# Patient Record
(demographics unavailable — no encounter records)

---

## 2025-03-19 NOTE — PHYSICAL EXAM
[Alert] : alert [Normocephalic] : normocephalic [Flat Open Anterior [place]] : flat open anterior fontanelle [PERRL] : PERRL [Red Reflex Bilateral] : red reflex bilateral [Normally Placed Ears] : normally placed ears [Auricles Well Formed] : auricles well formed [Clear Tympanic membranes] : clear tympanic membranes [Light reflex present] : light reflex present [Bony structures visible] : bony structures visible [Patent Auditory Canal] : patent auditory canal [Nares Patent] : nares patent [Palate Intact] : palate intact [Uvula Midline] : uvula midline [Supple, full passive range of motion] : supple, full passive range of motion [Symmetric Chest Rise] : symmetric chest rise [Clear to Auscultation Bilaterally] : clear to auscultation bilaterally [Regular Rate and Rhythm] : regular rate and rhythm [S1, S2 present] : S1, S2 present [+2 Femoral Pulses] : +2 femoral pulses [Soft] : soft [Bowel Sounds] : bowel sounds present [Umbilical Stump Dry, Clean, Intact] : umbilical stump dry, clean, intact [Normal external genitailia] : normal external genitalia [Central Urethral Opening] : central urethral opening [Testicles Descended Bilaterally] : testicles descended bilaterally [Patent] : patent [Normally Placed] : normally placed [No Abnormal Lymph Nodes Palpated] : no abnormal lymph nodes palpated [Symmetric Flexed Extremities] : symmetric flexed extremities [Startle Reflex] : startle reflex present [Suck Reflex] : suck reflex present [Rooting] : rooting reflex present [Palmar Grasp] : palmar grasp present [Plantar Grasp] : plantar reflex present [Symmetric Jayla] : symmetric [place] [Acute Distress] : no acute distress [Icteric sclera] : nonicteric sclera [Discharge] : no discharge [Palpable Masses] : no palpable masses [Murmurs] : no murmurs [Tender] : nontender [Distended] : not distended [Hepatomegaly] : no hepatomegaly [Splenomegaly] : no splenomegaly [Wilde-Ortolani] : negative Wilde-Ortolani [Spinal Dimple] : no spinal dimple [Tuft of Hair] : no tuft of hair [Jaundice] : not jaundice [de-identified] : extra digit on left foot

## 2025-03-19 NOTE — DISCUSSION/SUMMARY
[Normal Growth] : growth [Normal Development] : developmental [No Elimination Concerns] : elimination [Continue Regimen] : feeding [No Skin Concerns] : skin [Normal Sleep Pattern] : sleep [None] : no known medical problems [Anticipatory Guidance Given] : Anticipatory guidance addressed as per the history of present illness section [ Transition] :  transition [ Care] :  care [Nutritional Adequacy] : nutritional adequacy [Parental Well-Being] : parental well-being [Safety] : safety [No Vaccines] : no vaccines needed [No Medications] : ~He/She~ is not on any medications [Parent/Guardian] : Parent/Guardian [FreeTextEntry1] :  8 day old  growing and developing well with polydactyly of left foot.  will refer to orthopedist as extra digit has bone within in.  TSH and fT4 at lab due to abnormal  screen.  Routine  feeding and care reviewed.  Some sneezing, coughing, hiccups, mild nasal congestion can be normal.  anticipatory guidance provided.   A rectal temperature of 100.4 or higher would be extremely concerning and patient will need to go immediately to the ER for further assessment of care.   follow up for 1 month well care visit, sooner as needed

## 2025-03-19 NOTE — HISTORY OF PRESENT ILLNESS
[Born at ___ Wks Gestation] : The patient was born at [unfilled] weeks gestation [C/S] : via  section [BW: _____] : weight of [unfilled] [DW: _____] : Discharge weight was [unfilled] [Normal] : Normal [In Bassinet/Crib] : sleeps in bassinet/crib [On back] : sleeps on back [FreeTextEntry8] :  screen #: [id number] hearing screen passed 3/13/25 polydactyly of  left foot [Loose bedding, pillow, toys, and/or bumpers in crib] : no loose bedding, pillow, toys, and/or bumpers in crib [de-identified] : formula and some breast milk 2-3 ounces every 2-3 hours.  [FreeTextEntry1] : delivered by repeat  for preeclampsia.  mother is taking aspirin 81mg. Mother has 18mo son who is healthy.

## 2025-04-07 NOTE — DISCUSSION/SUMMARY
[Normal Growth] : growth [Normal Development] : development  [No Elimination Concerns] : elimination [Continue Regimen] : feeding [No Skin Concerns] : skin [Normal Sleep Pattern] : sleep [Term Infant] : term infant [None] : no medical problems [Anticipatory Guidance Given] : Anticipatory guidance addressed as per the history of present illness section [Parental Well-Being] : parental well-being [Family Adjustment] : family adjustment [Feeding Routines] : feeding routines [Infant Adjustment] : infant adjustment [Safety] : safety [Age Approp Vaccines] : Age appropriate vaccines administered [No Medications] : ~He/She~ is not on any medications [Mother] : mother [FreeTextEntry1] :  1 month old growing and developing well reviewed recent TSH, fT4 was normal  Continue feeding ad adeel. When in car, patient should be in rear-facing car seat in back seat. Put baby to sleep on back, in own crib with no loose or soft bedding. Help baby to develop sleep and feeding routines. May offer pacifier if needed. Start tummy time when awake. Limit baby's exposure to others, especially those with fever or unknown vaccine status. Parents counseled to call or go to ER if rectal temperature >100.4 degrees F.  advised to call orthopedics for polydactyly   follow up in 1 month for well , sooner as needed.

## 2025-04-07 NOTE — HISTORY OF PRESENT ILLNESS
[Mother] : mother [In Bassinet/Crib] : sleeps in bassinet/crib [On back] : sleeps on back [Normal] : Normal [Loose bedding, pillow, toys, and/or bumpers in crib] : no loose bedding, pillow, toys, and/or bumpers in crib [No] : No cigarette smoke exposure [Exposure to electronic nicotine delivery system] : No exposure to electronic nicotine delivery system [Rear facing car seat in back seat] : Rear facing car seat in back seat [Carbon Monoxide Detectors] : Carbon monoxide detectors at home [Smoke Detectors] : Smoke detectors at home. [de-identified] : similac formula 4oz every 2 hours, one 5 hours stretch overnight [YES] : Yes

## 2025-04-07 NOTE — DEVELOPMENTAL MILESTONES
[Passed] : passed [FreeTextEntry2] : 1 [Normal Development] : Normal Development [Calms when picked up or spoken to] : calms when picked up or spoken to [Looks briefly at objects] : looks briefly at objects [Alerts to unexpected sound] : alerts to unexpected sound [Makes brief short vowel sounds] : makes brief short vowel sounds [Holds chin up in prone] : holds chin up in prone [Holds fingers more open at rest] : holds fingers more open at rest [FreeTextEntry1] :  Social Determinants of Health domains were screened and scored.

## 2025-04-07 NOTE — PHYSICAL EXAM
[Alert] : alert [Acute Distress] : no acute distress [Normocephalic] : normocephalic [Flat Open Anterior [place]] : flat open anterior fontanelle [PERRL] : PERRL [Red Reflex Bilateral] : red reflex bilateral [Normally Placed Ears] : normally placed ears [Auricles Well Formed] : auricles well formed [Clear Tympanic membranes] : clear tympanic membranes [Light reflex present] : light reflex present [Bony landmarks visible] : bony landmarks visible [Discharge] : no discharge [Nares Patent] : nares patent [Palate Intact] : palate intact [Uvula Midline] : uvula midline [Supple, full passive range of motion] : supple, full passive range of motion [Palpable Masses] : no palpable masses [Symmetric Chest Rise] : symmetric chest rise [Clear to Auscultation Bilaterally] : clear to auscultation bilaterally [Regular Rate and Rhythm] : regular rate and rhythm [S1, S2 present] : S1, S2 present [Murmurs] : no murmurs [+2 Femoral Pulses] : +2 femoral pulses [Soft] : soft [Tender] : nontender [Distended] : not distended [Bowel Sounds] : bowel sounds present [Hepatomegaly] : no hepatomegaly [Splenomegaly] : no splenomegaly [Normal external genitailia] : normal external genitalia [Central Urethral Opening] : central urethral opening [Testicles Descended Bilaterally] : testicles descended bilaterally [Normally Placed] : normally placed [No Abnormal Lymph Nodes Palpated] : no abnormal lymph nodes palpated [Wilde-Ortolani] : negative Wilde-Ortolani [Symmetric Flexed Extremities] : symmetric flexed extremities [Spinal Dimple] : no spinal dimple [Tuft of Hair] : no tuft of hair [Startle Reflex] : startle reflex present [Suck Reflex] : suck reflex present [Rooting] : rooting reflex present [Palmar Grasp] : palmar grasp reflex present [Plantar Grasp] : plantar grasp reflex present [Symmetric Jayla] : symmetric [place] [Jaundice] : no jaundice [Rash and/or lesion present] : no rash/lesion [de-identified] : extra digit on left foot

## 2025-05-07 NOTE — DISCUSSION/SUMMARY
[Normal Growth] : growth [Normal Development] : development  [No Elimination Concerns] : elimination [Continue Regimen] : feeding [No Skin Concerns] : skin [Normal Sleep Pattern] : sleep [Anticipatory Guidance Given] : Anticipatory guidance addressed as per the history of present illness section [Age Approp Vaccines] : Age appropriate vaccines administered [No Medications] : ~He/She~ is not on any medications [Mother] : mother [] : The components of the vaccine(s) to be administered today are listed in the plan of care. The disease(s) for which the vaccine(s) are intended to prevent and the risks have been discussed with the caretaker.  The risks are also included in the appropriate vaccination information statements which have been provided to the patient's caregiver.  The caregiver has given consent to vaccinate. [FreeTextEntry1] : 2month old growing and developing well with recent admission to [facility] for UTI.   UTI:  advised urology followup and kidney/bladder ultrasound ordered. return precautions for fevers discussed. All parent's questions answered  Will treat diaper rash with muprirocin +hydrocortisone BID with a zinc oxide based diaper cream for all other changes.  Use water instead of wipes and allow to air dry. follow up if rash worsens.   well care: Continue feeding ad adeel.  When in car, patient should be in rear-facing car seat in back seat. Put baby to sleep on back, in own crib with no loose or soft bedding. Help baby to maintain sleep and feeding routines. May offer pacifier if needed. Continue tummy time when awake. Parents counseled to call if rectal temperature >100.4 degrees F.   vaccines: [id number], PCV20, Rota   number for orthopedics for polydactyly provided.   follow up in 2 months for well , sooner as needed

## 2025-05-07 NOTE — PHYSICAL EXAM
[Alert] : alert [Normocephalic] : normocephalic [Flat Open Anterior [place]] : flat open anterior fontanelle [PERRL] : PERRL [Red Reflex Bilateral] : red reflex bilateral [Normally Placed Ears] : normally placed ears [Auricles Well Formed] : auricles well formed [Clear Tympanic membranes] : clear tympanic membranes [Light reflex present] : light reflex present [Bony landmarks visible] : bony landmarks visible [Nares Patent] : nares patent [Palate Intact] : palate intact [Uvula Midline] : uvula midline [Supple, full passive range of motion] : supple, full passive range of motion [Symmetric Chest Rise] : symmetric chest rise [Clear to Auscultation Bilaterally] : clear to auscultation bilaterally [Regular Rate and Rhythm] : regular rate and rhythm [S1, S2 present] : S1, S2 present [+2 Femoral Pulses] : +2 femoral pulses [Soft] : soft [Bowel Sounds] : bowel sounds present [Normal external genitailia] : normal external genitalia [Central Urethral Opening] : central urethral opening [Testicles Descended Bilaterally] : testicles descended bilaterally [Normally Placed] : normally placed [No Abnormal Lymph Nodes Palpated] : no abnormal lymph nodes palpated [Symmetric Flexed Extremities] : symmetric flexed extremities [Startle Reflex] : startle reflex present [Suck Reflex] : suck reflex present [Rooting] : rooting reflex present [Palmar Grasp] : palmar grasp reflex present [Plantar Grasp] : plantar grasp reflex present [Symmetric Jayla] : symmetric [place] [Acute Distress] : no acute distress [Discharge] : no discharge [Palpable Masses] : no palpable masses [Murmurs] : no murmurs [Tender] : nontender [Distended] : not distended [Hepatomegaly] : no hepatomegaly [Splenomegaly] : no splenomegaly [Wilde-Ortolani] : negative Wilde-Ortolani [Spinal Dimple] : no spinal dimple [Tuft of Hair] : no tuft of hair [Rash and/or lesion present] : no rash/lesion [de-identified] : polydactyly of toes [de-identified] : diaper rash with open areas

## 2025-05-07 NOTE — DEVELOPMENTAL MILESTONES
[Normal Development] : Normal Development [Smiles responsively] : smiles responsively [Vocalizes with simple cooing] : vocalizes with simple cooing [Lifts head and chest in prone] : lifts head and chest in prone [Opens and shuts hands] : opens and shuts hands [FreeTextEntry1] :  Social Determinants of Health domains were screened and scored.  [Passed] : passed

## 2025-05-07 NOTE — HISTORY OF PRESENT ILLNESS
[Mother] : mother [Normal] : Normal [In Bassinet/Crib] : sleeps in bassinet/crib [On back] : sleeps on back [Loose bedding, pillow, toys, and/or bumpers in crib] : no loose bedding, pillow, toys, and/or bumpers in crib [No] : No cigarette smoke exposure [Exposure to electronic nicotine delivery system] : No exposure to electronic nicotine delivery system [Rear facing car seat in back seat] : Rear facing car seat in back seat [Carbon Monoxide Detectors] : Carbon monoxide detectors at home [Smoke Detectors] : Smoke detectors at home. [de-identified] : 4 oz formula every 2 hours. sleep through the night [FreeTextEntry1] : Mother reports that patient was admitted to [facility] with a UTI 6 days ago.  he was discharged 2 days ago  (admit date 5/3/25, discharge date 5/5/25). He was started on cephalexin 250/5ml take 3ml BID x7 days. they also advised polyvisol with iron for low iron levels- lab results from [facility] are not available, patient discharge summary reviewed. On discharge it was advised that a kidney and bladder ultrasound be scheduled for 4-5 weeks from    discharge.  Since discharge he is doing well but having frequent loose stool and has since developed a bad diaper rash.

## 2025-06-06 NOTE — CONSULT LETTER
[FreeTextEntry1] : Dear Dr. Taylor,   I had the pleasure of seeing Joe for follow up today. Below is my note regarding the office visit today.    Thank you so very much for allowing me to participate in Joe's care. Please don't hesitate to call me should any questions or issues arise .    Sincerely,    Ernie Euceda MD, FACS, [facility]    Chief, Pediatric Urology    Professor of Urology and Pediatrics    [employer]    President, American U[employer] Association - [place] Section    Past-President, Societies for Pediatric Urology

## 2025-06-06 NOTE — DATA REVIEWED
[FreeTextEntry1] : EXAMINATION: US RENAL AND PELVIS TODAY IN OFFICE FINDINGS: LEFT GRADE 1 HYDRONEPHROSIS OTHERWISE UNREMARKABLE KIDNEY AND PELVIC STRUCTURES  ACC: [id number]     EXAM:  US KIDNEYS AND BLADDER   ORDERED BY: RAMOS DIAZ PROCEDURE DATE:  05/12/2025 INTERPRETATION:  CLINICAL INFORMATION: UTI diagnosed at [facility] on 5/3/2025 COMPARISON: None available. TECHNIQUE: Sonography of the kidneys and bladder. FINDINGS: Right kidney: 4.7 cm. No renal mass, hydronephrosis or calculi.  Left kidney: 5.4 cm. Mild hydronephrosis.  Urinary bladder: Within normal limits.  IMPRESSION: Mild left hydronephrosis.  --- End of Report --- LJ WEINBERG MD; Resident Radiologist This document has been electronically signed. LINDA KERN MD; Attending Radiologist This document has been electronically signed. May 12 2025  4:04PM

## 2025-06-06 NOTE — CONSULT LETTER
[FreeTextEntry1] : Dear Dr. Taylor,   I had the pleasure of seeing Joe for follow up today. Below is my note regarding the office visit today.    Thank you so very much for allowing me to participate in Joe's care. Please don't hesitate to call me should any questions or issues arise .    Sincerely,    Ernie Euceda MD, FACS, [facility]    Chief, Pediatric Urology    Professor of Urology and Pediatrics    [employer]    President, American U[employer] Association - [place] Section    Past-President, Societies for Pediatric Urology

## 2025-06-06 NOTE — ASSESSMENT
[FreeTextEntry1] : Joe had a febrile UTI with hospitalization requiring IV antibiotics at outside hospital. Records not available for review. RBUS at time of hospital admission demonstrated mild left hydronephrosis. RBUS today demonstrated left grade 1 hydronephrosis. Physical exam today demonstrated phimosis and small bilateral hydroceles. We discussed causes, anatomical considerations and implications and came up with the following plan:  Febrile UTI and left sided hydronephrosis: -I explained the condition, its possible causes and implications. We discussed the evaluation and possible management strategies. Imaging in this case includes a sonogram, which was done and a VCUG. I described the VCUG test. I answered all questions. We will reconvene after the study. 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 I had a long discussion regarding the nature of hydroceles, their natural history and their management. At this point, observation is indicated as these will typically resolve with time. I suggested another visit at age 6-8 months for reexam. An earlier evaluation should take place if they note that the hydrocele has markedly increased in size or if there is suddenly changes in size during the course of the day. All questions were answered  Phimosis: -Mom discussed interest in a circumcision. Discussed that because of age this would have to be done in the operating room. We will rediscuss after VCUG and at follow up.

## 2025-06-06 NOTE — HISTORY OF PRESENT ILLNESS
[TextBox_4] : Joe is a 2 m.o. male being seen today after 5-day hospital stay for febrile UTI. Records not available for review. Patient presented 1 month ago to [facility] for malodorous urine and fever. Treated with IV antibiotics for 5 days and discharged on 8 days of PO antibiotics. RBUS 5/12/25 demonstrated mild left hydronephrosis.  Not currently on prophylaxis. Has not had any interval infections. Voiding and stooling appropriately, tolerating po intake.  Early development, Joe was born FT without complications and was discharged home with mom. Prenatal scans did not demonstrate hydronephrosis. No significant FH.

## 2025-06-06 NOTE — HISTORY OF PRESENT ILLNESS
[TextBox_4] : Joe is a 2 m.o. male being seen today after 5-day hospital stay for febrile UTI. Records not available for review. Patient presented 1 month ago to [facility] for malodorous urine and fever. Treated with IV antibiotics for 5 days and discharged on 8 days of PO antibiotics. RBUS 5/12/25 demonstrated mild left hydronephrosis.  Not currently on prophylaxis. Has not had any interval infections. Voiding and stooling appropriately, tolerating po intake.  Early development, Joe was born FT without complications and was discharged home with mom. Prenatal scans did not demonstrate hydronephrosis. No significant FH.

## 2025-06-06 NOTE — PHYSICAL EXAM
[Renomegaly] : no renomegaly [Dimple] : no dimple [Hair Tuft] : no hair tuft [TextBox_92] : Mother served as chaperone for exam.    PENIS: Uncircumcised with phimosis. No curvature, torsion, adhesions, skin bridges. Distinct penoscrotal junction. Distinct penopubic junction. Meatus at tip of the glans without apparent stenosis. No signs of infection.  TESTICLES: Bilateral testicles palpable in the dependent position of the scrotum, vertical lie, do no retract, without any masses, induration or tenderness, and approximately normal size, symmetric, and firm consistency.  SCROTAL/INGUINAL: Small bilateral hydroceles; No palpable inguinal hernias.

## 2025-06-06 NOTE — DATA REVIEWED
[FreeTextEntry1] : EXAMINATION: US RENAL AND PELVIS TODAY IN OFFICE FINDINGS: LEFT GRADE 1 HYDRONEPHROSIS OTHERWISE UNREMARKABLE KIDNEY AND PELVIC STRUCTURES  ACC: [id number]     EXAM:  US KIDNEYS AND BLADDER   ORDERED BY: RAMOS DIAZ PROCEDURE DATE:  05/12/2025 INTERPRETATION:  CLINICAL INFORMATION: UTI diagnosed at [facility] on 5/3/2025 COMPARISON: None available. TECHNIQUE: Sonography of the kidneys and bladder. FINDINGS: Right kidney: 4.7 cm. No renal mass, hydronephrosis or calculi.  Left kidney: 5.4 cm. Mild hydronephrosis.  Urinary bladder: Within normal limits.  IMPRESSION: Mild left hydronephrosis.  --- End of Report --- LJ WEINBERG MD; Resident Radiologist This document has been electronically signed. LINDA KERN MD; Attending Radiologist This document has been electronically signed. May 12 2025  4:04PM

## 2025-06-06 NOTE — REASON FOR VISIT
[Initial Consultation] : an initial consultation [Hydronephrosis] : hydronephrosis [PCP] : ~pcp~ [Mother] : mother [TextBox_50] : Febrile UTI

## 2025-07-02 NOTE — HISTORY OF PRESENT ILLNESS
[FreeTextEntry1] : Alison is an otherwise healthy 3-month-old baby boy who is brought in today by his mother for evaluation of his left foot.  Mother states that he was born at 38 weeks gestation via  delivery due to maternal hypertension.  He was noted to have 6 toes on his left foot.  It appears to be a postaxial polydactyly with a hole formed left pinky toe as the extra digit.  There is a nail present.  He does not seem to have active flexion or extension of this digit.  He is otherwise healthy.  There is no family history for any other toe deformity or orthopedic issues.  Here today for pediatric orthopedic evaluation and management.

## 2025-07-02 NOTE — PHYSICAL EXAM
[FreeTextEntry1] : Well-developed, well-nourished 3 month M  in no acute distress.  He  is awake and alert and appears to be resting comfortably. He  cries appropriately.  The head is normocephalic, atraumatic with full range of motion of the cervical spine with no pain.  Eyes are clear with no sclera abnormalities. Ears, nose and mouth are clear.   The child is moving all limbs spontaneously.  Full range of motion of bilateral upper extremities.  The motor exam is 5/5 of bilateral shoulders, elbows, wrists, and hands.  The pulses are 2+ at both wrists.   The child has full range of motion of bilateral hips, knees with motor exam of 5/5 of both lower extremities. Negative Ortolani, negative Wilde. Negative Galeazzi No apparent limb length discrepancy.  Sensation is grossly intact in bilateral upper and lower extremities. Pulses are 2+ at both feet.   + Left foot postaxial polydactyly with what appears to be full formation of the extra pinky toe.  There is no formed.  There does not appear to be any active flexion or extension to the digit.  There is brisk capillary refill.

## 2025-07-02 NOTE — ASSESSMENT
[FreeTextEntry1] : 3-month baby boy with left foot postaxial polydactyly  The history was obtained today from the child and parent; given the patient's age and/or the child's mental capacity, the history was unreliable and the parent was used as an independent historian.   The baby's clinical exam was thoroughly discussed with mother today.  He has an extra pinky toe on his left foot.  I explained that due to his young age, I do not recommend excision at this time.  As the child grows, surgical procedure can be performed to remove the extra digit.  We will continue with observation and plan to see the child back at approximately 1 year of age. This plan was discussed with family and all questions and concerns were addressed today.  Upon follow-up, we will get x-rays of the left foot at that time to evaluate the osseous structures.  I, Anamaria Villafana PA-C, have acted as a scribe and documented the above for Dr. Fabian  The above documentation completed by the scribe is an accurate record of both my words and actions.

## 2025-07-16 NOTE — PHYSICAL EXAM
[Alert] : alert [Acute Distress] : no acute distress [Normocephalic] : normocephalic [Flat Open Anterior [place]] : flat open anterior fontanelle [Red Reflex] : red reflex bilateral [PERRL] : PERRL [Normally Placed Ears] : normally placed ears [Auricles Well Formed] : auricles well formed [Clear Tympanic membranes] : clear tympanic membranes [Light reflex present] : light reflex present [Bony landmarks visible] : bony landmarks visible [Discharge] : no discharge [Nares Patent] : nares patent [Palate Intact] : palate intact [Uvula Midline] : uvula midline [Palpable Masses] : no palpable masses [Symmetric Chest Rise] : symmetric chest rise [Clear to Auscultation Bilaterally] : clear to auscultation bilaterally [Regular Rate and Rhythm] : regular rate and rhythm [S1, S2 present] : S1, S2 present [Murmurs] : no murmurs [+2 Femoral Pulses] : (+) 2 femoral pulses [Soft] : soft [Tender] : nontender [Distended] : nondistended [Bowel Sounds] : bowel sounds present [Hepatomegaly] : no hepatomegaly [Splenomegaly] : no splenomegaly [Central Urethral Opening] : central urethral opening [Testicles Descended] : testicles descended bilaterally [Patent] : patent [Normally Placed] : normally placed [No Abnormal Lymph Nodes Palpated] : no abnormal lymph nodes palpated [Wilde-Ortolani] : negative Wilde-Ortolani [Allis Sign] : negative Allis sign [Spinal Dimple] : no spinal dimple [Tuft of Hair] : no tuft of hair [Startle Reflex] : startle reflex present [Plantar Grasp] : plantar grasp reflex present [Symmetric Jayla] : symmetric jayla [Rash or Lesions] : no rash/lesions [de-identified] : polydactyly of toes

## 2025-07-16 NOTE — HISTORY OF PRESENT ILLNESS
[Mother] : mother [Normal] : Normal [In Bassinet/Crib] : sleeps in bassinet/crib [On back] : sleeps on back [Tummy time] : tummy time [PCV 20] : PCV 20 [DTaP/IPV/Hib] : DTaP/IPV/Hib [Rotavirus] : Rotavirus [No] : No cigarette smoke exposure [Rear facing car seat in back seat] : Rear facing car seat in back seat [Sleeps 12-16 hours per 24 hours (including naps)] : Does not sleep 12-16 hours per 24 hours (including naps) [Exposure to electronic nicotine delivery system] : No exposure to electronic nicotine delivery system [de-identified] : 30-36 oz per day of formula, sleep through the night

## 2025-07-16 NOTE — DEVELOPMENTAL MILESTONES
[Laughs aloud] : laughs aloud [Turns to voice] : turns to voice [Vocalizes with extending cooing] : vocalizes with extending cooing [Supports on elbows & wrists in prone] : supports on elbows and wrists in prone [Keeps hands unfisted] : keeps hands unfisted [Plays with fingers in midline] : plays with fingers in midline [Grasps objects] : grasps objects [Rolls over prone to supine] : does not roll over prone to supine

## 2025-07-16 NOTE — HISTORY OF PRESENT ILLNESS
[Mother] : mother [Normal] : Normal [In Bassinet/Crib] : sleeps in bassinet/crib [On back] : sleeps on back [Tummy time] : tummy time [PCV 20] : PCV 20 [DTaP/IPV/Hib] : DTaP/IPV/Hib [Rotavirus] : Rotavirus [No] : No cigarette smoke exposure [Rear facing car seat in back seat] : Rear facing car seat in back seat [Sleeps 12-16 hours per 24 hours (including naps)] : Does not sleep 12-16 hours per 24 hours (including naps) [Exposure to electronic nicotine delivery system] : No exposure to electronic nicotine delivery system [de-identified] : 30-36 oz per day of formula, sleep through the night

## 2025-07-16 NOTE — DISCUSSION/SUMMARY
[Normal Growth] : growth [Normal Development] : development  [No Elimination Concerns] : elimination [Continue Regimen] : feeding [No Skin Concerns] : skin [Normal Sleep Pattern] : sleep [None] : no medical problems [Anticipatory Guidance Given] : Anticipatory guidance addressed as per the history of present illness section [Family Functioning] : family functioning [Nutritional Adequacy and Growth] : nutritional adequacy and growth [Infant Development] : infant development [Oral Health] : oral health [Safety] : safety [Age Approp Vaccines] : Age appropriate vaccines administered [No Medications] : ~He/She~ is not on any medications [Mother] : mother [] : The components of the vaccine(s) to be administered today are listed in the plan of care. The disease(s) for which the vaccine(s) are intended to prevent and the risks have been discussed with the caretaker.  The risks are also included in the appropriate vaccination information statements which have been provided to the patient's caregiver.  The caregiver has given consent to vaccinate. [FreeTextEntry1] :  4 month old growing and developing well   Continue breast feeding or formula feeding with iron-fortified formulations, 2-4 oz every 3-4 hrs. Single fruit of vegetable purees or Cereal may be introduced using a spoon and bowl. Discussed repeating a single food for about 3 days before introducing something else new.  Avoid honey. When in car, patient should be in rear-facing car seat in back seat. Put baby to sleep on back, in own crib with no loose or soft bedding. Lower crib mattress. Help baby to maintain sleep and feeding routines. May offer pacifier if needed. Continue tummy time when awake.  follow with urology later this month as scheduled   vaccines today: [id number], PCV20, Rotavirus   Follow up in 2 months for routine care, sooner as needed

## 2025-07-16 NOTE — DISCUSSION/SUMMARY
[Normal Growth] : growth [Normal Development] : development  [No Elimination Concerns] : elimination [Continue Regimen] : feeding [No Skin Concerns] : skin [Normal Sleep Pattern] : sleep [None] : no medical problems [Anticipatory Guidance Given] : Anticipatory guidance addressed as per the history of present illness section [Family Functioning] : family functioning [Nutritional Adequacy and Growth] : nutritional adequacy and growth [Infant Development] : infant development [Oral Health] : oral health [Safety] : safety [Age Approp Vaccines] : Age appropriate vaccines administered [No Medications] : ~He/She~ is not on any medications [Mother] : mother [] : The components of the vaccine(s) to be administered today are listed in the plan of care. The disease(s) for which the vaccine(s) are intended to prevent and the risks have been discussed with the caretaker.  The risks are also included in the appropriate vaccination information statements which have been provided to the patient's caregiver.  The caregiver has given consent to vaccinate. [FreeTextEntry1] :  4 month old growing and developing well   Continue breast feeding or formula feeding with iron-fortified formulations, 2-4 oz every 3-4 hrs. Single fruit of vegetable purees or Cereal may be introduced using a spoon and bowl. Discussed repeating a single food for about 3 days before introducing something else new.  Avoid honey. When in car, patient should be in rear-facing car seat in back seat. Put baby to sleep on back, in own crib with no loose or soft bedding. Lower crib mattress. Help baby to maintain sleep and feeding routines. May offer pacifier if needed. Continue tummy time when awake.  follow with urology later this month as scheduled   vaccines today: [id number], PCV20, Rotavirus   Follow up in 2 months for routine care, sooner as needed

## 2025-07-16 NOTE — PHYSICAL EXAM
[Alert] : alert [Acute Distress] : no acute distress [Normocephalic] : normocephalic [Flat Open Anterior [place]] : flat open anterior fontanelle [Red Reflex] : red reflex bilateral [PERRL] : PERRL [Normally Placed Ears] : normally placed ears [Auricles Well Formed] : auricles well formed [Clear Tympanic membranes] : clear tympanic membranes [Light reflex present] : light reflex present [Bony landmarks visible] : bony landmarks visible [Discharge] : no discharge [Nares Patent] : nares patent [Palate Intact] : palate intact [Uvula Midline] : uvula midline [Palpable Masses] : no palpable masses [Symmetric Chest Rise] : symmetric chest rise [Clear to Auscultation Bilaterally] : clear to auscultation bilaterally [Regular Rate and Rhythm] : regular rate and rhythm [S1, S2 present] : S1, S2 present [Murmurs] : no murmurs [+2 Femoral Pulses] : (+) 2 femoral pulses [Soft] : soft [Tender] : nontender [Distended] : nondistended [Bowel Sounds] : bowel sounds present [Hepatomegaly] : no hepatomegaly [Splenomegaly] : no splenomegaly [Central Urethral Opening] : central urethral opening [Testicles Descended] : testicles descended bilaterally [Patent] : patent [Normally Placed] : normally placed [No Abnormal Lymph Nodes Palpated] : no abnormal lymph nodes palpated [Wilde-Ortolani] : negative Wilde-Ortolani [Allis Sign] : negative Allis sign [Spinal Dimple] : no spinal dimple [Tuft of Hair] : no tuft of hair [Startle Reflex] : startle reflex present [Plantar Grasp] : plantar grasp reflex present [Symmetric Jayla] : symmetric jayla [Rash or Lesions] : no rash/lesions [de-identified] : polydactyly of toes

## 2025-07-16 NOTE — PHYSICAL EXAM
[Alert] : alert [Acute Distress] : no acute distress [Normocephalic] : normocephalic [Flat Open Anterior [place]] : flat open anterior fontanelle [Red Reflex] : red reflex bilateral [PERRL] : PERRL [Normally Placed Ears] : normally placed ears [Auricles Well Formed] : auricles well formed [Clear Tympanic membranes] : clear tympanic membranes [Light reflex present] : light reflex present [Bony landmarks visible] : bony landmarks visible [Discharge] : no discharge [Nares Patent] : nares patent [Palate Intact] : palate intact [Uvula Midline] : uvula midline [Palpable Masses] : no palpable masses [Symmetric Chest Rise] : symmetric chest rise [Clear to Auscultation Bilaterally] : clear to auscultation bilaterally [Regular Rate and Rhythm] : regular rate and rhythm [S1, S2 present] : S1, S2 present [Murmurs] : no murmurs [+2 Femoral Pulses] : (+) 2 femoral pulses [Soft] : soft [Tender] : nontender [Distended] : nondistended [Bowel Sounds] : bowel sounds present [Hepatomegaly] : no hepatomegaly [Splenomegaly] : no splenomegaly [Central Urethral Opening] : central urethral opening [Testicles Descended] : testicles descended bilaterally [Patent] : patent [Normally Placed] : normally placed [No Abnormal Lymph Nodes Palpated] : no abnormal lymph nodes palpated [Wilde-Ortolani] : negative Wilde-Ortolani [Allis Sign] : negative Allis sign [Spinal Dimple] : no spinal dimple [Tuft of Hair] : no tuft of hair [Startle Reflex] : startle reflex present [Plantar Grasp] : plantar grasp reflex present [Symmetric Jayla] : symmetric jayla [Rash or Lesions] : no rash/lesions [de-identified] : polydactyly of toes

## 2025-07-16 NOTE — DISCUSSION/SUMMARY
[Normal Growth] : growth [Normal Development] : development  [No Elimination Concerns] : elimination [Continue Regimen] : feeding [No Skin Concerns] : skin [Normal Sleep Pattern] : sleep [None] : no medical problems [Anticipatory Guidance Given] : Anticipatory guidance addressed as per the history of present illness section [Family Functioning] : family functioning [Nutritional Adequacy and Growth] : nutritional adequacy and growth [Infant Development] : infant development [Oral Health] : oral health [Safety] : safety [Age Approp Vaccines] : Age appropriate vaccines administered [No Medications] : ~He/She~ is not on any medications [Mother] : mother [] : The components of the vaccine(s) to be administered today are listed in the plan of care. The disease(s) for which the vaccine(s) are intended to prevent and the risks have been discussed with the caretaker.  The risks are also included in the appropriate vaccination information statements which have been provided to the patient's caregiver.  The caregiver has given consent to vaccinate. [FreeTextEntry1] :  4 month old growing and developing well   Continue breast feeding or formula feeding with iron-fortified formulations, 2-4 oz every 3-4 hrs. Single fruit of vegetable purees or Cereal may be introduced using a spoon and bowl. Discussed repeating a single food for about 3 days before introducing something else new.  Avoid honey. When in car, patient should be in rear-facing car seat in back seat. Put baby to sleep on back, in own crib with no loose or soft bedding. Lower crib mattress. Help baby to maintain sleep and feeding routines. May offer pacifier if needed. Continue tummy time when awake.  follow with urology later this month as scheduled   vaccines today: [id number], PCV20, Rotavirus   Follow up in 2 months for routine care, sooner as needed

## 2025-07-16 NOTE — HISTORY OF PRESENT ILLNESS
[Mother] : mother [Normal] : Normal [In Bassinet/Crib] : sleeps in bassinet/crib [On back] : sleeps on back [Tummy time] : tummy time [PCV 20] : PCV 20 [DTaP/IPV/Hib] : DTaP/IPV/Hib [Rotavirus] : Rotavirus [No] : No cigarette smoke exposure [Rear facing car seat in back seat] : Rear facing car seat in back seat [Sleeps 12-16 hours per 24 hours (including naps)] : Does not sleep 12-16 hours per 24 hours (including naps) [Exposure to electronic nicotine delivery system] : No exposure to electronic nicotine delivery system [de-identified] : 30-36 oz per day of formula, sleep through the night

## 2025-07-30 NOTE — DATA REVIEWED
[FreeTextEntry1] : ACC: [id number]     EXAM:  US ABD TARGET DYN INIT LES   ORDERED BY: MARGARETTE HENSON  PROCEDURE DATE:  07/29/2025  INTERPRETATION:  EXAMINATION: Ultrasound voiding cystourethrogram  HISTORY: Hydronephrosis  COMPARISON: Renal bladder ultrasound 5/12/2025  TECHNIQUE: Using sterile technique an 8 [place] catheter was inserted into the urinary bladder.  Using ultrasound guidance 1 cc of Lumason Microbubbles were mixed with 500 cc of Saline, which was subsequently instilled into the bladder via gravity.  3 filling/voiding cycles were accomplished.  FINDINGS:  The urinary bladder appeared unremarkable. There is no vesicoureteral reflux with filling or voiding. The urethra was normal in appearance.  IMPRESSION:  No vesicoureteral reflux.  --- End of Report ---  VICTORNIA WREN MD; Attending Radiologist This document has been electronically signed. Jul 29 2025  3:57PM

## 2025-07-30 NOTE — HISTORY OF PRESENT ILLNESS
[TextBox_4] :  I verified the identity of the patient and the reason for the appointment with the parent. I explained to the parent that telemedicine encounters are not the same as a direct patient/healthcare provider visit because the patient and healthcare provider are not in the same room, which can result in limitations, including with the physical examination. I explained that the telemedicine encounter may require the patients genitalia to be shown. I explained that after the telemedicine encounter, the patient may require an office visit for an in-person physical examination, ultrasound, or other testing. I informed the parent that there may be privacy risks associated with the use of the technology and that there may be costs associated with the encounter. I offered the option of an office visit rather than a telemedicine encounter. Parent stated that all explanations were understood, and that all questions were answered to their satisfaction. The parent verbalized their preference and consent to proceed with the telemedicine encounter.  Joe is a 4 m.o. male being seen today to review VCUG. History of 5-day hospital stay for febrile UTI at 4 weeks old. Records not available for review. Patient presented to [facility] for malodorous urine and fever. Treated with IV antibiotics for 5 days and discharged on 8 days of PO antibiotics. RBUS 5/12/25 demonstrated mild left hydronephrosis. Not currently on prophylaxis. Has not had any interval infections sine last being seen 6/6/2025. No fevers. Making adequate wet diapers and stooling appropriately, tolerating po intake.

## 2025-07-30 NOTE — CONSULT LETTER
[FreeTextEntry1] : Dear Dr. Taylor,   I had the pleasure of seeing Joe for follow up today. Below is my note regarding the office visit today.    Thank you so very much for allowing me to participate in Joe's care. Please don't hesitate to call me should any questions or issues arise .    Sincerely,    Ernie Euceda MD, FACS, [facility]    Chief, Pediatric Urology    Professor of Urology and Pediatrics    [employer]    President, [employer] U[employer] Association - [place] Section    Past-President, [employer]eties [employer]

## 2025-07-30 NOTE — DATA REVIEWED
[FreeTextEntry1] : ACC: [id number]     EXAM:  US ABD TARGET DYN INIT LES   ORDERED BY: MARGARETTE HENSON  PROCEDURE DATE:  07/29/2025  INTERPRETATION:  EXAMINATION: Ultrasound voiding cystourethrogram  HISTORY: Hydronephrosis  COMPARISON: Renal bladder ultrasound 5/12/2025  TECHNIQUE: Using sterile technique an 8 [place] catheter was inserted into the urinary bladder.  Using ultrasound guidance 1 cc of Lumason Microbubbles were mixed with 500 cc of Saline, which was subsequently instilled into the bladder via gravity.  3 filling/voiding cycles were accomplished.  FINDINGS:  The urinary bladder appeared unremarkable. There is no vesicoureteral reflux with filling or voiding. The urethra was normal in appearance.  IMPRESSION:  No vesicoureteral reflux.  --- End of Report ---  VICTORINA WREN MD; Attending Radiologist This document has been electronically signed. Jul 29 2025  3:57PM

## 2025-07-30 NOTE — REASON FOR VISIT
[Mother] : mother [Home] : at home, [unfilled] , at the time of the visit. [Medical Office: ([facility])___] : at the medical office located in  [Telehealth (audio & video)] : This visit was provided via telehealth using real-time 2-way audio visual technology. [Follow-Up Visit] : a follow-up visit [Hydronephrosis] : hydronephrosis [PCP] : ~pcp~ [FreeTextEntry3] : mother [TextBox_50] : UG review

## 2025-07-30 NOTE — REASON FOR VISIT
[Mother] : mother [Home] : at home, [unfilled] , at the time of the visit. [Medical Office: ([facility])___] : at the medical office located in  [Telehealth (audio & video)] : This visit was provided via telehealth using real-time 2-way audio visual technology. [Follow-Up Visit] : a follow-up visit [Hydronephrosis] : hydronephrosis [PCP] : ~pcp~ [FreeTextEntry3] : mother [TextBox_50] : UG review

## 2025-07-30 NOTE — ASSESSMENT
[FreeTextEntry1] : Joe had a febrile UTI that required inpatient hospitalization and IV antibiotics. History of mild left hydronephrosis. VCUG 7/29/2025 was negative for VUR. Plan to repeat in office renal bladder US when patient is 12 months old to confirm resolution of left hydronephrosis and for reexam of mild hydrocele. Mother expressed satisfaction and understanding of the plan and all questions were answered.

## 2025-07-30 NOTE — CONSULT LETTER
[FreeTextEntry1] : Dear Dr. Taylor,   I had the pleasure of seeing Joe for follow up today. Below is my note regarding the office visit today.    Thank you so very much for allowing me to participate in Joe's care. Please don't hesitate to call me should any questions or issues arise .    Sincerely,    Ernie Euceda MD, FACS, [facility]    Chief, Pediatric Urology    Professor of Urology and Pediatrics    [employer]    President, American U[employer] Association - [place] Section    Past-President, Societies [employer] Pediatric U[employer]

## 2025-07-30 NOTE — HISTORY OF PRESENT ILLNESS
[TextBox_4] :  I verified the identity of the patient and the reason for the appointment with the parent. I explained to the parent that telemedicine encounters are not the same as a direct patient/healthcare provider visit because the patient and healthcare provider are not in the same room, which can result in limitations, including with the physical examination. I explained that the telemedicine encounter may require the patients genitalia to be shown. I explained that after the telemedicine encounter, the patient may require an office visit for an in-person physical examination, ultrasound, or other testing. I informed the parent that there may be privacy risks associated with the use of the technology and that there may be costs associated with the encounter. I offered the option of an office visit rather than a telemedicine encounter. Parent stated that all explanations were understood, and that all questions were answered to their satisfaction. The parent verbalized their preference and consent to proceed with the telemedicine encounter.  Joe is a 4 m.o. male being seen today to review VCUG. History of 5-day hospital stay for febrile UTI at 4 weeks old. Records not available for review. Patient presented to [facility] for malodorous urine and fever. Treated with IV antibiotics for 5 days and discharged on 8 days of PO antibiotics. RBUS 5/12/25 demonstrated mild left hydronephrosis. Not currently on prophylaxis. Has not had any interval infections sine last being seen 6/6/2025. No fevers. Making adequate wet diapers and stooling appropriately, tolerating po intake.